# Patient Record
Sex: FEMALE | Race: WHITE | NOT HISPANIC OR LATINO | Employment: PART TIME | URBAN - METROPOLITAN AREA
[De-identification: names, ages, dates, MRNs, and addresses within clinical notes are randomized per-mention and may not be internally consistent; named-entity substitution may affect disease eponyms.]

---

## 2017-11-27 ENCOUNTER — HOSPITAL ENCOUNTER (EMERGENCY)
Facility: HOSPITAL | Age: 37
Discharge: HOME | End: 2017-11-27
Attending: EMERGENCY MEDICINE | Admitting: EMERGENCY MEDICINE
Payer: COMMERCIAL

## 2017-11-27 ENCOUNTER — APPOINTMENT (EMERGENCY)
Dept: RADIOLOGY | Facility: HOSPITAL | Age: 37
End: 2017-11-27
Payer: COMMERCIAL

## 2017-11-27 VITALS
TEMPERATURE: 98.1 F | WEIGHT: 155 LBS | RESPIRATION RATE: 18 BRPM | DIASTOLIC BLOOD PRESSURE: 99 MMHG | SYSTOLIC BLOOD PRESSURE: 182 MMHG | HEART RATE: 89 BPM | BODY MASS INDEX: 23.49 KG/M2 | HEIGHT: 68 IN | OXYGEN SATURATION: 98 %

## 2017-11-27 DIAGNOSIS — S80.02XA CONTUSION OF LEFT KNEE, INITIAL ENCOUNTER: ICD-10-CM

## 2017-11-27 DIAGNOSIS — S20.319A CHEST ABRASION: ICD-10-CM
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
## 2017-11-27 DIAGNOSIS — S82.891A CLOSED AVULSION FRACTURE OF RIGHT ANKLE, INITIAL ENCOUNTER: Primary | ICD-10-CM

## 2017-11-27 PROCEDURE — 99284 EMERGENCY DEPT VISIT MOD MDM: CPT

## 2017-11-27 PROCEDURE — 73610 X-RAY EXAM OF ANKLE: CPT

## 2017-11-27 PROCEDURE — 73564 X-RAY EXAM KNEE 4 OR MORE: CPT

## 2017-11-27 RX ORDER — NAPROXEN 250 MG/1
500 TABLET ORAL ONCE
Status: COMPLETED | OUTPATIENT
Start: 2017-11-27 | End: 2017-11-27

## 2017-11-27 RX ORDER — NAPROXEN 500 MG/1
250 TABLET ORAL 2 TIMES DAILY WITH MEALS
Qty: 20 TABLET | Refills: 0 | Status: SHIPPED | OUTPATIENT
Start: 2017-11-27 | End: 2018-01-30

## 2017-11-27 RX ADMIN — NAPROXEN 500 MG: 250 TABLET ORAL at 13:11

## 2017-11-27 NOTE — DISCHARGE INSTRUCTIONS
Please remain off of the leg, use the crutches and splint until you are seen and re-evaluated by orthopedic doctor within a week return if you worsening or other up other concerning symptoms otherwise follow up as instructed       Ankle Fracture   WHAT YOU NEED TO KNOW:   An ankle fracture is a break in 1 or more of the bones in your ankle  DISCHARGE INSTRUCTIONS:   Call 911 for any of the following:   · You feel lightheaded, short of breath, and have chest pain  · You cough up blood  Return to the emergency department if:   · Your leg feels warm, tender, and painful  It may look swollen and red  · Blood soaks through your bandage  · You have severe pain in your ankle  · Your cast feels too tight  · Your foot or toes are cold or numb  · Your foot or toenails turn blue or gray  Contact your healthcare provider if:   · Your splint feels too tight  · Your swelling has increased or returned  · You have a fever  · Your pain does not go away, even after treatment  · You have questions or concerns about your condition or care  Medicines: You may need any of the following:  · Acetaminophen  decreases pain and fever  It is available without a doctor's order  Ask how much to take and how often to take it  Follow directions  Acetaminophen can cause liver damage if not taken correctly  · NSAIDs , such as ibuprofen, help decrease swelling, pain, and fever  This medicine is available with or without a doctor's order  NSAIDs can cause stomach bleeding or kidney problems in certain people  If you take blood thinner medicine, always ask your healthcare provider if NSAIDs are safe for you  Always read the medicine label and follow directions  · Prescription pain medicine  may be given  Ask your healthcare provider how to take this medicine safely  · Take your medicine as directed  Contact your healthcare provider if you think your medicine is not helping or if you have side effects   Tell him or her if you are allergic to any medicine  Keep a list of the medicines, vitamins, and herbs you take  Include the amounts, and when and why you take them  Bring the list or the pill bottles to follow-up visits  Carry your medicine list with you in case of an emergency  Follow up with your healthcare provider in 1 to 2 days: Your fracture may need to be reduced (bones pushed back into place) or you may need surgery  Write down your questions so you remember to ask them during your visits  Support devices: You will be given a brace, cast, or splint to limit your movement and protect your ankle  You may need to use crutches to protect your ankle and decrease your pain as you move around  Do not remove your device and do not put weight on your injured ankle  Splint and cast care:  Cover the splint or cast before you bathe so it does not get wet  Tape 2 plastic trash bags to your skin above the cast  Try to keep your ankle out of the water as much as possible  Rest:  Rest your ankle so that it can heal  Return to normal activities as directed  Ice:  Apply ice on your ankle for 15 to 20 minutes every hour or as directed  Use an ice pack, or put crushed ice in a plastic bag  Cover it with a towel  Ice helps prevent tissue damage and decreases swelling and pain  Elevate:  Elevate your ankle above the level of your heart as often as you can  This will help decrease swelling and pain  Prop your ankle on pillows or blankets to keep it elevated comfortably  © 2017 [address] [employer] Information is for End User's use only and may not be sold, redistributed or otherwise used for commercial purposes  All illustrations and images included in CareNotes® are the copyrighted property of [employer]  or Yaniv Shirley  The above information is an  only  It is not intended as medical advice for individual conditions or treatments   Talk to your doctor, nurse or pharmacist before following any medical regimen to see if it is safe and effective for you  Avulsion Fracture   WHAT YOU NEED TO KNOW:   An avulsion fracture is when a small piece of bone breaks and pulls away from a larger bone  Part or all of the piece may break away  An avulsion fracture may happen when a muscle, tendon, or ligament is pulled very hard  This can occur during a fall or while playing sports  DISCHARGE INSTRUCTIONS:   Call 911 for any of the following:   · You feel lightheaded, short of breath, and have chest pain  · You cough up blood  · Your leg feels warm, tender, and painful  It may look swollen and red  Return to the emergency department if:   · Your cast cracks or is damaged  · The pain in your injured limb gets worse even after you rest and take medicine  · The skin, toes, or fingers of your injured limb become swollen, cold, or blue  Contact your healthcare provider if:   · You have numbness or tingling in your hand or foot below your cast     · You cannot move your fingers or toes below the cast      · You have new sores or redness around your cast or splint  · You have new or worsening trouble moving your injured limb  · You have questions or concerns about your condition or care  Medicines:   · Pain medicine  may be given  Ask your healthcare provider how to take this medicine safely  · Take your medicine as directed  Contact your healthcare provider if you think your medicine is not helping or if you have side effects  Tell him or her if you are allergic to any medicine  Keep a list of the medicines, vitamins, and herbs you take  Include the amounts, and when and why you take them  Bring the list or the pill bottles to follow-up visits  Carry your medicine list with you in case of an emergency  Follow up with your healthcare provider as directed: You will need to return for more tests to see how your fracture is healing   Write down your questions so you remember to ask them during your visits  Limit activity as directed:  Get plenty of rest while your fracture heals  When the pain decreases, begin normal, slow movements  Slowly start to do more each day  Rest when you feel it is needed  Ice:  Apply ice on your injury for 15 to 20 minutes every hour or as directed  Use an ice pack, or put crushed ice in a plastic bag  Cover it with a towel  Ice helps prevent tissue damage and decreases swelling and pain  Elevation:  Elevate your injured limb above the level of your heart as often as you can  This will help decrease swelling and pain  Prop your injured limb on pillows or blankets to keep it elevated comfortably  Bathing with a cast or splint: If you have a cast or splint, it is important not to get it wet  Before bathing, cover the cast or splint with a plastic bag  Tape the bag to your skin above the cast or splint to seal out the water  Hold your arm or leg away from the water in case the bag leaks  Ask when it is okay to take a bath or shower  Cast or splint care:   · Check the skin around the cast or splint every day  · Do not push down or lean on any part of the cast or splint because it may break  · Do not use a sharp or pointed object to scratch your skin under the cast or splint  Walking devices: You may need to use crutches or a walker until your fracture heals  Ask for more information about how to use these walking devices if needed  Physical therapy:  A physical therapist may teach you exercises to strengthen your injured limb once the pain is gone  © 2017 [employer] Information is for End User's use only and may not be sold, redistributed or otherwise used for commercial purposes  All illustrations and images included in CareNotes® are the copyrighted property of [employer]  or Yaniv Shirley  The above information is an  only  It is not intended as medical advice for individual conditions or treatments   Talk to your doctor, nurse or pharmacist before following any medical regimen to see if it is safe and effective for you
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
## 2017-12-05 ENCOUNTER — GENERIC CONVERSION - ENCOUNTER (OUTPATIENT)
Dept: OTHER | Facility: OTHER | Age: 37
End: 2017-12-05

## 2017-12-05 ENCOUNTER — APPOINTMENT (OUTPATIENT)
Dept: RADIOLOGY | Facility: MEDICAL CENTER | Age: 37
End: 2017-12-05
Payer: COMMERCIAL

## 2017-12-05 DIAGNOSIS — M25.579 PAIN IN ANKLE: ICD-10-CM

## 2017-12-05 DIAGNOSIS — S82.832A OTHER FRACTURE OF UPPER AND LOWER END OF LEFT FIBULA, INITIAL ENCOUNTER FOR CLOSED FRACTURE: ICD-10-CM

## 2017-12-05 DIAGNOSIS — S93.409A SPRAIN OF LIGAMENT OF ANKLE: ICD-10-CM

## 2017-12-05 PROCEDURE — 73610 X-RAY EXAM OF ANKLE: CPT

## 2017-12-13 ENCOUNTER — APPOINTMENT (OUTPATIENT)
Dept: PHYSICAL THERAPY | Facility: CLINIC | Age: 37
End: 2017-12-13
Payer: COMMERCIAL

## 2017-12-13 ENCOUNTER — GENERIC CONVERSION - ENCOUNTER (OUTPATIENT)
Dept: OBGYN CLINIC | Facility: MEDICAL CENTER | Age: 37
End: 2017-12-13

## 2017-12-13 PROCEDURE — G8990 OTHER PT/OT CURRENT STATUS: HCPCS

## 2017-12-13 PROCEDURE — 97161 PT EVAL LOW COMPLEX 20 MIN: CPT

## 2017-12-13 PROCEDURE — 97535 SELF CARE MNGMENT TRAINING: CPT

## 2017-12-13 PROCEDURE — G8991 OTHER PT/OT GOAL STATUS: HCPCS

## 2017-12-15 ENCOUNTER — APPOINTMENT (OUTPATIENT)
Dept: PHYSICAL THERAPY | Facility: CLINIC | Age: 37
End: 2017-12-15
Payer: COMMERCIAL

## 2017-12-15 PROCEDURE — 97140 MANUAL THERAPY 1/> REGIONS: CPT

## 2017-12-15 PROCEDURE — 97110 THERAPEUTIC EXERCISES: CPT

## 2017-12-19 ENCOUNTER — GENERIC CONVERSION - ENCOUNTER (OUTPATIENT)
Dept: OBGYN CLINIC | Facility: MEDICAL CENTER | Age: 37
End: 2017-12-19

## 2017-12-19 ENCOUNTER — APPOINTMENT (OUTPATIENT)
Dept: PHYSICAL THERAPY | Facility: CLINIC | Age: 37
End: 2017-12-19
Payer: COMMERCIAL

## 2017-12-19 PROCEDURE — 97140 MANUAL THERAPY 1/> REGIONS: CPT

## 2017-12-19 PROCEDURE — 97110 THERAPEUTIC EXERCISES: CPT

## 2017-12-21 ENCOUNTER — APPOINTMENT (OUTPATIENT)
Dept: PHYSICAL THERAPY | Facility: CLINIC | Age: 37
End: 2017-12-21
Payer: COMMERCIAL

## 2017-12-21 PROCEDURE — 97110 THERAPEUTIC EXERCISES: CPT

## 2017-12-21 PROCEDURE — 97140 MANUAL THERAPY 1/> REGIONS: CPT

## 2017-12-25 ENCOUNTER — HOSPITAL ENCOUNTER (EMERGENCY)
Facility: HOSPITAL | Age: 37
Discharge: HOME | End: 2017-12-25
Attending: EMERGENCY MEDICINE
Payer: COMMERCIAL

## 2017-12-25 VITALS
DIASTOLIC BLOOD PRESSURE: 111 MMHG | HEART RATE: 90 BPM | WEIGHT: 160 LBS | TEMPERATURE: 98.6 F | HEIGHT: 68 IN | RESPIRATION RATE: 20 BRPM | BODY MASS INDEX: 24.25 KG/M2 | SYSTOLIC BLOOD PRESSURE: 171 MMHG | OXYGEN SATURATION: 97 %

## 2017-12-25 DIAGNOSIS — S61.211A LACERATION OF LEFT INDEX FINGER WITHOUT FOREIGN BODY WITHOUT DAMAGE TO NAIL, INITIAL ENCOUNTER: Primary | ICD-10-CM

## 2017-12-25 PROCEDURE — 99282 EMERGENCY DEPT VISIT SF MDM: CPT

## 2017-12-25 PROCEDURE — 90715 TDAP VACCINE 7 YRS/> IM: CPT | Performed by: EMERGENCY MEDICINE

## 2017-12-25 PROCEDURE — 90471 IMMUNIZATION ADMIN: CPT

## 2017-12-25 RX ORDER — LIDOCAINE HYDROCHLORIDE AND EPINEPHRINE 10; 10 MG/ML; UG/ML
5 INJECTION, SOLUTION INFILTRATION; PERINEURAL ONCE
Status: COMPLETED | OUTPATIENT
Start: 2017-12-25 | End: 2017-12-25

## 2017-12-25 RX ORDER — BACITRACIN, NEOMYCIN, POLYMYXIN B 400; 3.5; 5 [USP'U]/G; MG/G; [USP'U]/G
1 OINTMENT TOPICAL ONCE
Status: COMPLETED | OUTPATIENT
Start: 2017-12-25 | End: 2017-12-25

## 2017-12-25 RX ADMIN — LIDOCAINE HYDROCHLORIDE,EPINEPHRINE BITARTRATE 5 ML: 10; .01 INJECTION, SOLUTION INFILTRATION; PERINEURAL at 11:05

## 2017-12-25 RX ADMIN — TETANUS TOXOID, REDUCED DIPHTHERIA TOXOID AND ACELLULAR PERTUSSIS VACCINE, ADSORBED 0.5 ML: 5; 2.5; 8; 8; 2.5 SUSPENSION INTRAMUSCULAR at 11:03

## 2017-12-25 RX ADMIN — BACITRACIN, NEOMYCIN, POLYMYXIN B 1 SMALL APPLICATION: 400; 3.5; 5 OINTMENT TOPICAL at 11:40

## 2017-12-25 NOTE — DISCHARGE INSTRUCTIONS
Finger Laceration   WHAT YOU NEED TO KNOW:   A finger laceration is a deep cut in your skin  It is often caused by a sharp object, such as a knife, or blunt force to your finger  Your blood vessels, bones, joints, tendons, or nerves may also be injured  DISCHARGE INSTRUCTIONS:   Return to the emergency department if:   · Your wound comes apart  · Blood soaks through your bandage  · You have severe pain in your finger or hand  · Your finger is pale and cold  · You have sudden trouble moving your finger  · Your swelling suddenly gets worse  · You have red streaks on your skin coming from your wound  Contact your healthcare provider or hand specialist if:   · You have new numbness or tingling  · Your finger feels warm, looks swollen or red, and is draining pus  · You have a fever  · You have questions or concerns about your condition or care  Medicines: You may  need any of the following:  · Antibiotics  help prevent a bacterial infection  · Acetaminophen  decreases pain and fever  It is available without a doctor's order  Ask how much to take and how often to take it  Follow directions  Read the labels of all other medicines you are using to see if they also contain acetaminophen, or ask your doctor or pharmacist  Acetaminophen can cause liver damage if not taken correctly  Do not use more than 4 grams (4,000 milligrams) total of acetaminophen in one day  · Prescription pain medicine  may be given  Ask your healthcare provider how to take this medicine safely  Some prescription pain medicines contain acetaminophen  Do not take other medicines that contain acetaminophen without talking to your healthcare provider  Too much acetaminophen may cause liver damage  Prescription pain medicine may cause constipation  Ask your healthcare provider how to prevent or treat constipation  · Take your medicine as directed    Contact your healthcare provider if you think your medicine is not helping or if you have side effects  Tell him or her if you are allergic to any medicine  Keep a list of the medicines, vitamins, and herbs you take  Include the amounts, and when and why you take them  Bring the list or the pill bottles to follow-up visits  Carry your medicine list with you in case of an emergency  Self-care:   · Apply ice  on your finger for 15 to 20 minutes every hour or as directed  Use an ice pack, or put crushed ice in a plastic bag  Cover it with a towel before you apply it to your skin  Ice helps prevent tissue damage and decreases swelling and pain  · Elevate  your hand above the level of your heart as often as you can  This will help decrease swelling and pain  Prop your hand on pillows or blankets to keep it elevated comfortably  · Wear your splint as directed  A splint will decrease movement and stress on your wound  The splint may help your wound heal faster  Ask your healthcare provider how to apply and remove a splint  · Apply ointments to decrease scarring  Do not apply ointments until your healthcare provider says it is okay  You may need to wait until your wound is healed  Ask which ointment to buy and how often to use it  Wound care:   · Do not get your wound wet until your healthcare provider says it is okay  Do not soak your hand in water  Do not go swimming until your healthcare provider says it is okay  When your healthcare provider says it is okay, carefully wash around the wound with soap and water  Let soap and water run over your wound  Gently pat the area dry or allow it to air dry  · Change your bandages when they get wet, dirty, or after washing  Apply new, clean bandages as directed  Do not apply elastic bandages or tape too tightly  Do not put powders or lotions on your wound  · Apply antibiotic ointment as directed  Your healthcare provider may give you antibiotic ointment to put over your wound if you have stitches   If you have Strips-Strips over your wound, let them dry up and fall off on their own  If they do not fall off within 14 days, gently remove them  If you have glue over your wound, do not remove or pick at it  If your glue comes off, do not replace it with glue that you have at home  · Check your wound every day for signs of infection  Signs of infection include swelling, redness, or pus  Follow up with your healthcare provider or hand specialist in 2 days:  Write down your questions so you remember to ask them during your visits  © 2017 [address] [employer] Information is for End User's use only and may not be sold, redistributed or otherwise used for commercial purposes  All illustrations and images included in CareNotes® are the copyrighted property of [employer]  or [employer]  The above information is an  only  It is not intended as medical advice for individual conditions or treatments  Talk to your doctor, nurse or pharmacist before following any medical regimen to see if it is safe and effective for you  Care For Your Stitches   WHAT YOU NEED TO KNOW:   Stitches, or sutures, are used to close cuts and wounds on the skin  Stitches need to be removed after your wound has healed  DISCHARGE INSTRUCTIONS:   Seek care immediately if:   · Your stitches come apart  · Blood soaks through your bandages  · You suddenly cannot move your injured joint  · You have sudden numbness around your wound  · You see red streaks coming from your wound  Contact your healthcare provider if:   · You have a fever and chills  · Your wound is red, warm, swollen, or leaking pus  · There is a bad smell coming from your wound  · You have increased pain in the wound area  · You have questions or concerns about your condition or care  Care for your stitches:  Keep your stitches clean and dry   You may need to cover your stitches with a bandage for 24 to 48 hours, or as directed  Do not bump or hit the suture area, as this could open the wound  Do not trim or shorten the ends of your stitches  If they rub on your clothing, put a gauze bandage between the stitches and your clothes  Clean your wound:  Carefully wash your wound with soap and water  For mouth and lip wounds, rinse your mouth after meals and at bedtime  Ask your healthcare provider what to use to rinse your mouth  If you have a scalp wound, you may gently wash your hair every 2 days with mild shampoo  Do not use hair products, such as hair spray  Help your wound heal:   · Elevate  your wound above the level of your heart as often as you can  This will help decrease swelling and pain  Prop your wound on pillows or blankets to keep it elevated comfortably  · Limit activity  Do not stretch the skin around your wound  This will help prevent bleeding and swelling of the wound area  Follow up with your healthcare provider as directed: You may need to return to have your stitches removed  Write down your questions so you remember to ask them during your visits  © 2017 [address] [facility] Information is for End User's use only and may not be sold, redistributed or otherwise used for commercial purposes  All illustrations and images included in CareNotes® are the copyrighted property of [employer]  or [employer]  The above information is an  only  It is not intended as medical advice for individual conditions or treatments  Talk to your doctor, nurse or pharmacist before following any medical regimen to see if it is safe and effective for you

## 2017-12-25 NOTE — ED PROVIDER NOTES
History  Chief Complaint   Patient presents with    Finger Laceration     Pt stated that she cut her left pointer finger last night while cooking  Patient is a 59-year-old female with no significant past medical history, presenting to the emergency department with a left 2nd digit laceration that she sustained last night while preparing food  Patient states the injury happened with a kitchen knife and approximately 9:00 p m  last night  She washed out the wound and did not think it needed stitches however when she awoke today, it continued to bleed so she thought she would come to the ED for evaluation  She states she wrapped it very tightly last night and had some tingling sensation in the tip of her finger but ever since she removed the wound dressing, the tingling sensation has improved  She denies any significant pain other than mild burning at the wound site  Denies any weakness in the hand or discoloration of the digit  She denies being on any blood thinners  Denies any history of immunocompromising conditions or poor wound healing in the past   She is unsure when her last tetanus shot was but does not think she is up-to-date  She has no other symptoms or complaints at this time and review of systems otherwise negative  History provided by:  Patient   used: No    Finger Laceration   Associated symptoms: no fever and no rash        Prior to Admission Medications   Prescriptions Last Dose Informant Patient Reported? Taking?   naproxen (NAPROSYN) 500 mg tablet   No No   Sig: Take 0 5 tablets by mouth 2 (two) times a day with meals for 10 days      Facility-Administered Medications: None       History reviewed  No pertinent past medical history  Past Surgical History:   Procedure Laterality Date    TUBAL LIGATION         History reviewed  No pertinent family history  I have reviewed and agree with the history as documented      Social History   Substance Use Topics  Smoking status: Current Every Day Smoker     Packs/day: 0 50    Smokeless tobacco: Never Used    Alcohol use No        Review of Systems   Constitutional: Negative for chills and fever  HENT: Negative for congestion, ear pain, rhinorrhea and sore throat  Eyes: Negative for pain and visual disturbance  Respiratory: Negative for cough, shortness of breath and wheezing  Cardiovascular: Negative for chest pain and palpitations  Gastrointestinal: Negative for abdominal pain, constipation, diarrhea, nausea and vomiting  Genitourinary: Negative for dysuria, flank pain, frequency and hematuria  Musculoskeletal: Negative for back pain and neck pain  Skin: Positive for wound  Negative for color change, pallor and rash  Allergic/Immunologic: Negative for immunocompromised state  Neurological: Positive for numbness  Negative for dizziness, syncope, weakness, light-headedness and headaches  Hematological: Does not bruise/bleed easily  Psychiatric/Behavioral: Negative for confusion and decreased concentration  All other systems reviewed and are negative  Physical Exam  ED Triage Vitals [12/25/17 1021]   Temperature Pulse Respirations Blood Pressure SpO2   98 6 °F (37 °C) 90 20 (!) 171/111 97 %      Temp Source Heart Rate Source Patient Position - Orthostatic VS BP Location FiO2 (%)   Oral Monitor Sitting Right arm --      Pain Score       8           Orthostatic Vital Signs  Vitals:    12/25/17 1021   BP: (!) 171/111   Pulse: 90   Patient Position - Orthostatic VS: Sitting       Physical Exam   Constitutional: She is oriented to person, place, and time  She appears well-developed and well-nourished  No distress  HENT:   Head: Normocephalic and atraumatic  Mucous membranes moist    Eyes: Conjunctivae and EOM are normal  Pupils are equal, round, and reactive to light  Neck: Normal range of motion  Neck supple     Cardiovascular: Normal rate, regular rhythm, normal heart sounds and intact distal pulses  Exam reveals no gallop and no friction rub  No murmur heard  Pulmonary/Chest: Effort normal and breath sounds normal  No respiratory distress  She has no wheezes  She has no rales  Abdominal: Soft  She exhibits no distension  There is no tenderness  There is no rebound and no guarding  Musculoskeletal: Normal range of motion  She exhibits no edema or tenderness  Left upper extremity neurovascularly intact  No gross motor or sensory deficits in the ulnar, radial or median nerve distribution  Full range of motion of left hand and left 2nd digit at all joints  No significant bony tenderness of the 2nd digit of the left hand  Neurological: She is alert and oriented to person, place, and time  No gross motor or sensory deficits  Skin: Skin is warm and dry  No rash noted  She is not diaphoretic  No pallor  There is a 1 5-2 cm V-shaped laceration of the left 2nd digit, palmar/medial aspect, just proximal to the DIP joint  Wound does not cross joint line  Minimal venous oozing but no arterial bleeding  No tendon exposure  No surrounding erythema or warmth  No significant swelling of the digit  Psychiatric: She has a normal mood and affect  Her behavior is normal    Nursing note and vitals reviewed        ED Medications  Medications   neomycin-bacitracin-polymyxin b (NEOSPORIN) ointment 1 small application (not administered)   tetanus-diphtheria-acellular pertussis (BOOSTRIX) IM injection 0 5 mL (0 5 mL Intramuscular Given 12/25/17 1103)   lidocaine-epinephrine (XYLOCAINE/EPINEPHRINE) 1 %-1:100,000 injection 5 mL (5 mL Infiltration Given 12/25/17 1105)       Diagnostic Studies  Results Reviewed     None                 No orders to display              Procedures  Lac Repair  Date/Time: 12/25/2017 11:32 AM  Performed by: Chase Silva by: Robert Milian     Patient location:  ED and bedside  Other Assisting Provider: No    Consent:     Consent obtained:  Verbal Consent given by:  Patient    Risks discussed:  Infection, need for additional repair, nerve damage, poor wound healing, poor cosmetic result, pain, retained foreign body, tendon damage and vascular damage    Alternatives discussed:  No treatment and referral  Universal protocol:     Procedure explained and questions answered to patient or proxy's satisfaction: yes      Imaging studies available: no      Patient identity confirmed:  Verbally with patient  Anesthesia (see MAR for exact dosages): Anesthesia method:  Nerve block    Block needle gauge:  27 G    Block anesthetic:  Lidocaine 1% WITH epi    Block technique:  Digital block - medial and lateral side of base of left 2nd digit    Block injection procedure:  Anatomic landmarks identified, anatomic landmarks palpated and negative aspiration for blood    Block outcome:  Anesthesia achieved  Laceration details:     Location:  Finger    Finger location:  L index finger    Length (cm) of Repair:  1 5  Repair type:     Repair type:  Simple  Exploration:     Hemostasis achieved with:  Direct pressure    Wound exploration: wound explored through full range of motion and entire depth of wound probed and visualized      Wound extent: no foreign bodies/material noted, no muscle damage noted, no tendon damage noted and no vascular damage noted      Contaminated: no    Treatment:     Area cleansed with:  Water    Amount of cleaning:  Standard    Irrigation solution:  Sterile water    Irrigation method:  Pressure wash    Visualized foreign bodies/material removed: yes    Skin repair:     Repair method:  Sutures    Suture size:  5-0    Suture material:  Nylon    Suture technique:  Simple interrupted    Number of sutures:  3  Approximation:     Approximation:  Loose    Approximation difficulty:  Simple  Post-procedure details:     Dressing:  Antibiotic ointment and sterile dressing    Patient tolerance of procedure:   Tolerated well, no immediate complications Phone Contacts  ED Phone Contact    ED Course  ED Course                                MDM  Number of Diagnoses or Management Options  Diagnosis management comments: Left 2nd digit laceration, superficial but continuously losing blood  Despite patient being just out of the 12 hour window for surgical repair, I will tack down wound with 2-3 stitches as it will likely continue to reopen if not tacked down  Advised patient to be on the lookout for any signs of wound infection such as erythema, warmth, proximal red streaking, finger swelling or increased pain or fever or chills  Will update tetanus  Also advised patient to follow up with a family doctor to get her blood pressure rechecked as it is significantly elevated today  She does states she always has high blood pressure but has never been evaluated by a primary care doctor for this  CritCare Time    Disposition  Final diagnoses:   Laceration of left index finger without foreign body without damage to nail, initial encounter     Time reflects when diagnosis was documented in both MDM as applicable and the Disposition within this note     Time User Action Codes Description Comment    12/25/2017 11:34 AM Ana Akins [[id number]] Laceration of left index finger without foreign body without damage to nail, initial encounter       ED Disposition     ED Disposition Condition Comment    Discharge  [facility] discharge to home/self care      Condition at discharge: Stable        Follow-up Information     Follow up With Specialties Details Why Contact Info Additional Information    [employer]  Call to get referral to a primary care doctor [address] [place] [address] Emergency Department Emergency Medicine Go to For suture removal in 10-14 days or immediately if signs of wound infection develop [address] [facility] [address]  [phone] [place] ED, [address] [facility], [address] [place] [address], [phone]        Patient's Medications   Discharge Prescriptions    No medications on file     No discharge procedures on file      ED Provider  Electronically Signed by           Yolis Fish DO  12/25/17 1131

## 2017-12-28 ENCOUNTER — APPOINTMENT (OUTPATIENT)
Dept: PHYSICAL THERAPY | Facility: CLINIC | Age: 37
End: 2017-12-28
Payer: COMMERCIAL

## 2018-01-02 ENCOUNTER — APPOINTMENT (OUTPATIENT)
Dept: RADIOLOGY | Facility: MEDICAL CENTER | Age: 38
End: 2018-01-02
Payer: COMMERCIAL

## 2018-01-02 ENCOUNTER — ALLSCRIPTS OFFICE VISIT (OUTPATIENT)
Dept: OTHER | Facility: OTHER | Age: 38
End: 2018-01-02

## 2018-01-02 ENCOUNTER — APPOINTMENT (OUTPATIENT)
Dept: PHYSICAL THERAPY | Facility: CLINIC | Age: 38
End: 2018-01-02
Payer: COMMERCIAL

## 2018-01-02 DIAGNOSIS — M25.579 PAIN IN ANKLE: ICD-10-CM

## 2018-01-02 PROCEDURE — 97140 MANUAL THERAPY 1/> REGIONS: CPT

## 2018-01-02 PROCEDURE — 73610 X-RAY EXAM OF ANKLE: CPT

## 2018-01-02 PROCEDURE — 97110 THERAPEUTIC EXERCISES: CPT

## 2018-01-05 ENCOUNTER — GENERIC CONVERSION - ENCOUNTER (OUTPATIENT)
Dept: OBGYN CLINIC | Facility: MEDICAL CENTER | Age: 38
End: 2018-01-05

## 2018-01-09 ENCOUNTER — HOSPITAL ENCOUNTER (EMERGENCY)
Facility: HOSPITAL | Age: 38
Discharge: HOME | End: 2018-01-09
Attending: EMERGENCY MEDICINE | Admitting: EMERGENCY MEDICINE
Payer: COMMERCIAL

## 2018-01-09 VITALS
DIASTOLIC BLOOD PRESSURE: 119 MMHG | RESPIRATION RATE: 16 BRPM | SYSTOLIC BLOOD PRESSURE: 170 MMHG | TEMPERATURE: 97.9 F | HEART RATE: 92 BPM | OXYGEN SATURATION: 99 %

## 2018-01-09 DIAGNOSIS — Z48.02 VISIT FOR SUTURE REMOVAL: Primary | ICD-10-CM

## 2018-01-09 PROCEDURE — 99281 EMR DPT VST MAYX REQ PHY/QHP: CPT

## 2018-01-09 NOTE — ED PROVIDER NOTES
History  Chief Complaint   Patient presents with    Suture / Staple Removal     40year old female presents to ED for suture removal   Patient reports she sliced her left index finger with knife 2 weeks ago  Was seen in ED for suture repair  Reports good wound healing  She reports mild numbness around laceration  Denies any other deficits  Location left ring finger  Quality is sutured wound  Severity is reported as mild  Modifiers: no known aggravating or alleviating factors  History provided by:  Patient   used: No        Prior to Admission Medications   Prescriptions Last Dose Informant Patient Reported? Taking?   naproxen (NAPROSYN) 500 mg tablet   No No   Sig: Take 0 5 tablets by mouth 2 (two) times a day with meals for 10 days      Facility-Administered Medications: None       History reviewed  No pertinent past medical history  Past Surgical History:   Procedure Laterality Date    TUBAL LIGATION         History reviewed  No pertinent family history  I have reviewed and agree with the history as documented  Social History   Substance Use Topics    Smoking status: Current Every Day Smoker     Packs/day: 0 50    Smokeless tobacco: Never Used    Alcohol use No        Review of Systems   Constitutional: Negative for activity change, diaphoresis, fatigue and fever  HENT: Negative for ear pain, nosebleeds, rhinorrhea and sore throat  Eyes: Negative for pain, discharge, redness and itching  Respiratory: Negative for cough, shortness of breath, wheezing and stridor  Cardiovascular: Negative for chest pain, palpitations and leg swelling  Gastrointestinal: Negative for abdominal pain, constipation, diarrhea, nausea and vomiting  Endocrine: Negative  Genitourinary: Negative for dysuria, frequency, hematuria and urgency  Musculoskeletal: Negative for arthralgias, back pain, gait problem, joint swelling, myalgias, neck pain and neck stiffness     Skin: Positive for wound  Negative for color change, pallor and rash  Allergic/Immunologic: Negative  Neurological: Negative for dizziness, tremors, seizures, syncope, facial asymmetry, speech difficulty, weakness, light-headedness and headaches  Hematological: Negative  Psychiatric/Behavioral: Negative for behavioral problems, decreased concentration and hallucinations  The patient is not nervous/anxious  All other systems reviewed and are negative  Physical Exam  ED Triage Vitals [01/09/18 1541]   Temperature Pulse Respirations Blood Pressure SpO2   97 9 °F (36 6 °C) 92 16 (!) 170/119 99 %      Temp Source Heart Rate Source Patient Position - Orthostatic VS BP Location FiO2 (%)   Tympanic -- -- Right arm --      Pain Score       --           Orthostatic Vital Signs  Vitals:    01/09/18 1541   BP: (!) 170/119   Pulse: 92       Physical Exam   Constitutional: She is oriented to person, place, and time  She appears well-developed and well-nourished  No distress  BP (!) 170/119   Pulse 92   Temp 97 9 °F (36 6 °C) (Tympanic)   Resp 16   SpO2 99%   interp blood pressure hypertensive  Pulse normal     HENT:   Head: Normocephalic and atraumatic  Right Ear: External ear normal    Left Ear: External ear normal    Nose: Nose normal    Eyes: Conjunctivae and EOM are normal  Right eye exhibits no discharge  Left eye exhibits no discharge  No scleral icterus  Neck: Normal range of motion  Neck supple  No tracheal deviation present  Cardiovascular: Normal rate, regular rhythm and intact distal pulses  Pulmonary/Chest: Effort normal and breath sounds normal  No stridor  No respiratory distress  She has no wheezes  Abdominal: Soft  Bowel sounds are normal  She exhibits no distension  There is no tenderness  Musculoskeletal: Normal range of motion  She exhibits no edema, tenderness or deformity  Left index finger - normal inspection, no gross deformity, cap refill less than 3 seconds    Strength 5/5 normal   FROM at left DIPj, PIPj, and MCPj  Flex/ext tendon function fully intact  Lymphadenopathy:     She has no cervical adenopathy  Neurological: She is alert and oriented to person, place, and time  She displays normal reflexes  No cranial nerve deficit or sensory deficit  She exhibits normal muscle tone  Coordination normal    Skin: Skin is warm and dry  Capillary refill takes less than 2 seconds  No rash noted  She is not diaphoretic  No erythema  No pallor  Well healed laceration to left index finger  All sutures intact  No surrounding erythema  No dehiscence  Psychiatric: She has a normal mood and affect  Her behavior is normal  Judgment and thought content normal    Nursing note and vitals reviewed  ED Medications  Medications - No data to display    Diagnostic Studies  Results Reviewed     None                 No orders to display              Procedures  Suture Removal  Date/Time: 1/9/2018 3:40 PM  Performed by: Ramone Dick  Authorized by: Misti Lloyd     Patient location:  ED  Other Assisting Provider: No    Consent:     Consent obtained:  Verbal    Consent given by:  Patient    Risks discussed:  Bleeding, pain and wound separation    Alternatives discussed:  No treatment  Universal protocol:     Patient identity confirmed:  Verbally with patient  Location:     Laterality:  Left    Location:  Upper extremity    Upper extremity location:  Hand    Hand location:  L index finger  Procedure details: Tools used:  Suture removal kit    Wound appearance:  No sign(s) of infection    Number of sutures removed:  3  Post-procedure details:     Post-removal:  No dressing applied    Patient tolerance of procedure:   Tolerated well, no immediate complications    Complication (if applicable):  None           Phone Contacts  ED Phone Contact    ED Course  ED Course                                MDM  Number of Diagnoses or Management Options  Visit for suture removal: minor  Diagnosis management comments: Sutures removed  Patient blood pressure noted to be elevated, instructed to follow up with pcp for recheck and further evaluation of elevated blood pressure  Patient Progress  Patient progress: stable    CritCare Time    Disposition  Final diagnoses:   Visit for suture removal     Time reflects when diagnosis was documented in both MDM as applicable and the Disposition within this note     Time User Action Codes Description Comment    1/9/2018  3:39 PM Anel Akins [Z48 02] Visit for suture removal       ED Disposition     ED Disposition Condition Comment    Discharge  [address] [place] [address] discharge to home/self care  Condition at discharge: Stable        Follow-up Information     Follow up With Specialties Details Why Contact Info Additional Information    Joseph Laurent MD Family Medicine Call in 1 week For wound re-check [address] Emergency Department Emergency Medicine Go to If symptoms worsen [address] [facility] [address]  [phone] [place] ED, [address] [facility], [address]        Discharge Medication List as of 1/9/2018  3:40 PM      CONTINUE these medications which have NOT CHANGED    Details   naproxen (NAPROSYN) 500 mg tablet Take 0 5 tablets by mouth 2 (two) times a day with meals for 10 days, Starting Mon 11/27/2017, Until Thu 12/7/2017, Print           No discharge procedures on file      ED Provider  Electronically Signed by           Candace Baez PA-C  01/09/18 6387

## 2018-01-09 NOTE — DISCHARGE INSTRUCTIONS
Stitches Removal   AMBULATORY CARE:   Stitches  may need to be removed in 3 to 14 days depending on the location of your wound  Your healthcare provider will use sterile forceps or tweezers to  the knot of each stitch  He will cut the stitch with scissors and pull the stitch out  You may feel a slight tug as the stitch comes out  He may place small steristrips across your wound after the stitches have been removed  These pieces of tape will peel and fall of on their own  Do not pull them off  Seek care immediately if:   · Your wound splits open  · You suddenly cannot move your injured joint  · You have sudden numbness around your wound  · You see red streaks coming from your wound  Contact your healthcare provider if:   · You have a fever and chills  · Your wound is red, warm, swollen, or leaking pus  · There is a bad smell coming from your wound  · You have increased pain in the wound area  · You have questions or concerns about your condition or care  Care for your wound:   · Clean your wound as directed  Carefully wash your wound with soap and water  Pat the area dry with a clean towel  · Protect your wound  Your wound can swell, bleed, or split open if it is stretched or bumped  You may need to wear a bandage that supports your wound until it is completely healed  · Minimize your scar  Use sunblock if your wound is exposed to the sun  Apply it every day after the stitches are removed  This will help prevent skin discoloration  Follow up with your healthcare provider as directed: You may need to return in 3 to 5 days if the stitches are on your face  Stitches on your scalp need to be removed after 7 to 14 days  Stitches over joints may remain in place up to 14 days  Write down your questions so you remember to ask them during your visits     © 2017 [address] [employer] Information is for End User's use only and may not be sold, redistributed or otherwise used for commercial purposes  All illustrations and images included in CareNotes® are the copyrighted property of [employer]  or [employer]  The above information is an  only  It is not intended as medical advice for individual conditions or treatments  Talk to your doctor, nurse or pharmacist before following any medical regimen to see if it is safe and effective for you

## 2018-01-19 ENCOUNTER — GENERIC CONVERSION - ENCOUNTER (OUTPATIENT)
Dept: OBGYN CLINIC | Facility: MEDICAL CENTER | Age: 38
End: 2018-01-19

## 2018-01-23 NOTE — MISCELLANEOUS
Message  Return to work or school:   Reynaldo Gee is under my professional care   She was seen in my office on 1/2/18             Signatures   Electronically signed by : Will Caban DO; Jan 2 2018  1:54PM EST                       (Author)

## 2018-01-24 VITALS
WEIGHT: 155 LBS | HEART RATE: 80 BPM | DIASTOLIC BLOOD PRESSURE: 104 MMHG | HEIGHT: 68 IN | BODY MASS INDEX: 23.49 KG/M2 | SYSTOLIC BLOOD PRESSURE: 153 MMHG

## 2018-01-30 ENCOUNTER — OFFICE VISIT (OUTPATIENT)
Dept: FAMILY MEDICINE CLINIC | Facility: MEDICAL CENTER | Age: 38
End: 2018-01-30
Payer: COMMERCIAL

## 2018-01-30 ENCOUNTER — APPOINTMENT (OUTPATIENT)
Dept: LAB | Facility: MEDICAL CENTER | Age: 38
End: 2018-01-30
Payer: COMMERCIAL

## 2018-01-30 ENCOUNTER — APPOINTMENT (OUTPATIENT)
Dept: RADIOLOGY | Facility: MEDICAL CENTER | Age: 38
End: 2018-01-30
Payer: COMMERCIAL

## 2018-01-30 ENCOUNTER — OFFICE VISIT (OUTPATIENT)
Dept: OBGYN CLINIC | Facility: MEDICAL CENTER | Age: 38
End: 2018-01-30
Payer: COMMERCIAL

## 2018-01-30 VITALS
HEIGHT: 68 IN | SYSTOLIC BLOOD PRESSURE: 166 MMHG | RESPIRATION RATE: 16 BRPM | DIASTOLIC BLOOD PRESSURE: 116 MMHG | BODY MASS INDEX: 27.4 KG/M2 | WEIGHT: 180.8 LBS | HEART RATE: 76 BPM

## 2018-01-30 VITALS
BODY MASS INDEX: 23.49 KG/M2 | WEIGHT: 155 LBS | HEIGHT: 68 IN | HEART RATE: 78 BPM | SYSTOLIC BLOOD PRESSURE: 170 MMHG | DIASTOLIC BLOOD PRESSURE: 117 MMHG

## 2018-01-30 DIAGNOSIS — I10 HYPERTENSION, UNSPECIFIED TYPE: Primary | ICD-10-CM

## 2018-01-30 DIAGNOSIS — R06.83 SNORING: ICD-10-CM

## 2018-01-30 DIAGNOSIS — I10 ESSENTIAL HYPERTENSION: ICD-10-CM

## 2018-01-30 DIAGNOSIS — M25.571 PAIN, JOINT, ANKLE AND FOOT, RIGHT: Primary | ICD-10-CM

## 2018-01-30 DIAGNOSIS — S82.831D CLOSED FRACTURE OF DISTAL END OF RIGHT FIBULA WITH ROUTINE HEALING, UNSPECIFIED FRACTURE MORPHOLOGY, SUBSEQUENT ENCOUNTER: ICD-10-CM

## 2018-01-30 DIAGNOSIS — I10 HYPERTENSION, UNSPECIFIED TYPE: ICD-10-CM

## 2018-01-30 DIAGNOSIS — J35.1 LARGE TONSILS: ICD-10-CM

## 2018-01-30 PROBLEM — S82.831A FRACTURE OF FIBULA, DISTAL, RIGHT, CLOSED: Status: ACTIVE | Noted: 2017-12-05

## 2018-01-30 PROBLEM — M25.579 ANKLE PAIN: Status: ACTIVE | Noted: 2017-12-05

## 2018-01-30 LAB
ALBUMIN SERPL BCP-MCNC: 4 G/DL (ref 3.5–5)
ALP SERPL-CCNC: 72 U/L (ref 46–116)
ALT SERPL W P-5'-P-CCNC: 49 U/L (ref 12–78)
ANION GAP SERPL CALCULATED.3IONS-SCNC: 7 MMOL/L (ref 4–13)
AST SERPL W P-5'-P-CCNC: 15 U/L (ref 5–45)
BILIRUB SERPL-MCNC: 0.35 MG/DL (ref 0.2–1)
BUN SERPL-MCNC: 7 MG/DL (ref 5–25)
CALCIUM SERPL-MCNC: 8.8 MG/DL (ref 8.3–10.1)
CHLORIDE SERPL-SCNC: 102 MMOL/L (ref 100–108)
CHOLEST SERPL-MCNC: 159 MG/DL (ref 50–200)
CO2 SERPL-SCNC: 26 MMOL/L (ref 21–32)
CREAT SERPL-MCNC: 0.83 MG/DL (ref 0.6–1.3)
GFR SERPL CREATININE-BSD FRML MDRD: 90 ML/MIN/1.73SQ M
GLUCOSE P FAST SERPL-MCNC: 83 MG/DL (ref 65–99)
HDLC SERPL-MCNC: 36 MG/DL (ref 40–60)
LDLC SERPL CALC-MCNC: 101 MG/DL (ref 0–100)
POTASSIUM SERPL-SCNC: 3.8 MMOL/L (ref 3.5–5.3)
PROT SERPL-MCNC: 8.2 G/DL (ref 6.4–8.2)
SODIUM SERPL-SCNC: 135 MMOL/L (ref 136–145)
TRIGL SERPL-MCNC: 110 MG/DL
TSH SERPL DL<=0.05 MIU/L-ACNC: 3.53 UIU/ML (ref 0.36–3.74)

## 2018-01-30 PROCEDURE — 99214 OFFICE O/P EST MOD 30 MIN: CPT | Performed by: FAMILY MEDICINE

## 2018-01-30 PROCEDURE — 36415 COLL VENOUS BLD VENIPUNCTURE: CPT

## 2018-01-30 PROCEDURE — 73610 X-RAY EXAM OF ANKLE: CPT

## 2018-01-30 PROCEDURE — 3008F BODY MASS INDEX DOCD: CPT | Performed by: FAMILY MEDICINE

## 2018-01-30 PROCEDURE — 80053 COMPREHEN METABOLIC PANEL: CPT

## 2018-01-30 PROCEDURE — 80061 LIPID PANEL: CPT

## 2018-01-30 PROCEDURE — 99202 OFFICE O/P NEW SF 15 MIN: CPT | Performed by: FAMILY MEDICINE

## 2018-01-30 PROCEDURE — 84443 ASSAY THYROID STIM HORMONE: CPT

## 2018-01-30 RX ORDER — PERINDOPRIL ERBUMINE 4 MG/1
4 TABLET ORAL DAILY
Qty: 90 TABLET | Refills: 0 | Status: SHIPPED | OUTPATIENT
Start: 2018-01-30 | End: 2018-01-31

## 2018-01-30 RX ORDER — OXYCODONE HYDROCHLORIDE 5 MG/1
5 TABLET ORAL
COMMUNITY
Start: 2015-10-18 | End: 2018-01-30

## 2018-01-30 RX ORDER — DOCUSATE SODIUM 100 MG/1
100 CAPSULE, LIQUID FILLED ORAL
COMMUNITY
Start: 2015-10-18 | End: 2018-01-30

## 2018-01-30 NOTE — LETTER
January 30, 2018     Patient: Sowmya Cheema   YOB: 1980   Date of Visit: 1/30/2018       To Whom it May Concern:    Sowmya Cheema is under my professional care  She was seen in my office on 1/30/2018  She may return to work on 01/31/2018       If you have any questions or concerns, please don't hesitate to call           Sincerely,          Alex Guerrero III, DO        CC: Sowmya Cheema

## 2018-01-30 NOTE — PATIENT INSTRUCTIONS
Patient to continue wearing ankle brace for the next 4-6 months only during high risk activity including jogging running weight training  I recommend against wearing high heels until 4 months since date of injury  I reviewed risk of continued elevated blood pressure with patient including stroke, heart attack, hemorrhage, aneurysm, and death  I explained the patient that her blood pressure will never be cured unless she has Medicine in her body at all times  I explained to her that she requires laboratory evaluation as well as prescription drug medicine to control her blood pressure and prevent serious medical problem  I explained to her that even if she has no symptoms that she still is at risk for severe medical issues

## 2018-01-30 NOTE — PROGRESS NOTES
1  Pain, joint, ankle and foot, right  XR ankle 3+ vw right   2  Closed fracture of distal end of right fibula with routine healing, unspecified fracture morphology, subsequent encounter     3  Essential hypertension       Orders Placed This Encounter   Procedures    XR ankle 3+ vw right        Imaging Studies (I personally reviewed results in PACS):   X-ray right ankle:  No acute osseous abnormality  There is persistent spur small avulsion fracture distal fibula without displacement and with evidence of healing  IMPRESSION:  Right small avulsion fracture distal fibula  Date of injury 11/26/2017      Return if symptoms worsen or fail to improve  Patient Instructions     Patient to continue wearing ankle brace for the next 4-6 months only during high risk activity including jogging running weight training  I recommend against wearing high heels until 4 months since date of injury  I reviewed risk of continued elevated blood pressure with patient including stroke, heart attack, hemorrhage, aneurysm, and death  I explained the patient that her blood pressure will never be cured unless she has Medicine in her body at all times  I explained to her that she requires laboratory evaluation as well as prescription drug medicine to control her blood pressure and prevent serious medical problem  I explained to her that even if she has no symptoms that she still is at risk for severe medical issues  CHIEF COMPLAINT:   Follow-up right distal fibular fractureCHIEF COMPLAINT:    HPI:  Ajay Nolan is a 40 y o  female  who presents for follow-up of right distal fibular fracture  Date of injury 11/26/2017  Follow-up interval:  8 weeks  Last visit patient had mild tenderness distal fibular  She was continued on lace-up ankle brace and physical therapy  Genice Pean states she feels 100% improved  She has been walking with her brace without any issues    She denies any stiffness or weakness  Recently patient was in the emergency department for laceration to her finger from a knife wound  Subsequently it was noted that she had elevated blood pressure significantly high  Patient was encouraged to follow-up with the primary care physician however she does not have 1 at this time  She is currently seeking primary care physician  Today, patient denies any headache chest pain trouble breathing change in vision weakness slurring his speech difficulty talking  She is currently not taking any blood pressure medications  She has noted she has had blood pressure issues for approximately 1 year  She denies any symptoms  Review of Systems   Constitutional: Negative for chills and fever  Neurological: Negative for numbness  Following history reviewed and update:    History reviewed  No pertinent past medical history  Past Surgical History:   Procedure Laterality Date    GALLBLADDER SURGERY  2015    TUBAL LIGATION       Social History   History   Alcohol Use No     History   Drug Use No     History   Smoking Status    Current Every Day Smoker    Packs/day: 0 50   Smokeless Tobacco    Never Used     Family History   Problem Relation Age of Onset    Cancer Mother     No Known Problems Father      No Known Allergies       Physical Exam   Constitutional: She is oriented to person, place, and time  She appears well-developed and well-nourished  HENT:   Head: Normocephalic and atraumatic  Right Ear: External ear normal    Left Ear: External ear normal    Nose: Nose normal    Eyes: Conjunctivae are normal  Right eye exhibits no discharge  Left eye exhibits no discharge  No scleral icterus  Neck: Neck supple  Pulmonary/Chest: Effort normal  No respiratory distress  Neurological: She is alert and oriented to person, place, and time  Psychiatric: She has a normal mood and affect   Her behavior is normal  Judgment and thought content normal        Right Ankle Exam   Right ankle exam is normal   Swelling: none    Tenderness   The patient is experiencing no tenderness  Range of Motion   The patient has normal right ankle ROM  Muscle Strength   The patient has normal right ankle strength    Dorsiflexion:  5/5  Plantar flexion:  5/5  Anterior tibial:  5/5  Posterior tibial:  5/5  Gastrocsoleus:  5/5  Peroneal muscle:  5/5    Tests   Varus tilt: negative    Other   Erythema: absent  Sensation: normal     Comments:   Negative tibia-fibula squeeze   Negative metatarsal squeeze  Negative calcaneal squeeze  Negative external rotation dorsiflexion test              Procedures

## 2018-01-30 NOTE — PROGRESS NOTES
Assessment/Plan:    No problem-specific Assessment & Plan notes found for this encounter  Diagnoses and all orders for this visit:    Hypertension, unspecified type  -     Comprehensive metabolic panel; Future  -     Lipid panel; Future  -     TSH, 3rd generation with T4 reflex; Future  -     Home Study; Future  -     perindopril (ACEON) 4 MG tablet; Take 1 tablet (4 mg total) by mouth daily    Snoring  -     Home Study; Future    Large tonsils  -     Home Study; Future      Blood pressure not well controlled  Unclear if this is essential hypertension or potentially hypertension secondary to sleep apnea  I will have patient get some baseline labs  Due to her snoring enlarged tonsils I recommended she get a sleep study to assess for sleep apnea and patient agreed  Order placed for a home sleep study  In the meantime I recommended patient start medication within ACE-inhibitor  Potential side effects of Ace inhibitors discussed and patient is agreeable to starting the medication  Close follow-up in two weeks or sooner if needed  Subjective:      Patient ID: Libby Lomeli is a 40 y o  female  Patient presents to establish care  Has high blood pressure  Diagnosed about one year ago at [facility] but patient unsure how long prior to that it has been elevated  She does not have any headaches or visual problems  She does smoke about one pack per day for the past 20 years  She does snore loudly but is unaware of any pauses in breathing when she sleeps  The following portions of the patient's history were reviewed and updated as appropriate: allergies, current medications, past family history, past medical history, past social history, past surgical history and problem list     Review of Systems   Constitutional: Negative for fever  Respiratory: Negative for shortness of breath  Cardiovascular: Negative for chest pain           Objective:     Physical Exam   Constitutional: She is oriented to person, place, and time  Vital signs are normal    HENT:   Head: Normocephalic and atraumatic  Right Ear: Tympanic membrane, external ear and ear canal normal    Left Ear: Tympanic membrane, external ear and ear canal normal    Nose: Nose normal    Mouth/Throat: Uvula is midline, oropharynx is clear and moist and mucous membranes are normal    Tonsils enlarged bilaterally  Eyes: Conjunctivae, EOM and lids are normal  Pupils are equal, round, and reactive to light  Neck: Trachea normal  Neck supple  No thyromegaly present  Cardiovascular: Normal rate, regular rhythm, S1 normal and S2 normal     No murmur heard  Pulmonary/Chest: Effort normal and breath sounds normal    Abdominal: There is no hepatosplenomegaly  Lymphadenopathy:     She has no cervical adenopathy  Neurological: She is alert and oriented to person, place, and time  Skin: Skin is warm and dry     Psychiatric: Her speech is normal and behavior is normal  Judgment and thought content normal  Cognition and memory are normal

## 2018-01-31 ENCOUNTER — TELEPHONE (OUTPATIENT)
Dept: FAMILY MEDICINE CLINIC | Facility: MEDICAL CENTER | Age: 38
End: 2018-01-31

## 2018-01-31 DIAGNOSIS — I10 HYPERTENSION, UNSPECIFIED TYPE: Primary | ICD-10-CM

## 2018-01-31 RX ORDER — BENAZEPRIL HYDROCHLORIDE 20 MG/1
20 TABLET ORAL DAILY
Qty: 90 TABLET | Refills: 0 | Status: SHIPPED | OUTPATIENT
Start: 2018-01-31 | End: 2018-05-14

## 2018-01-31 NOTE — TELEPHONE ENCOUNTER
Pt aware, understands instructions  She also said that her insurance doesn't cover the perindopril without a prior auth

## 2018-01-31 NOTE — TELEPHONE ENCOUNTER
----- Message from Patricia Anne DO sent at 1/31/2018  8:28 AM EST -----  Labs reviewed  Sodium slightly low  No intervention at this time  May improve since patient has been started on blood pressure medication  Cholesterol levels not too bad  No additional medications at this time    Thyroid function normal

## 2018-02-12 ENCOUNTER — OFFICE VISIT (OUTPATIENT)
Dept: FAMILY MEDICINE CLINIC | Facility: MEDICAL CENTER | Age: 38
End: 2018-02-12
Payer: COMMERCIAL

## 2018-02-12 VITALS
RESPIRATION RATE: 18 BRPM | WEIGHT: 179 LBS | BODY MASS INDEX: 27.62 KG/M2 | SYSTOLIC BLOOD PRESSURE: 132 MMHG | HEART RATE: 76 BPM | DIASTOLIC BLOOD PRESSURE: 86 MMHG

## 2018-02-12 DIAGNOSIS — I10 HYPERTENSION, UNSPECIFIED TYPE: Primary | ICD-10-CM

## 2018-02-12 PROBLEM — M25.579 ANKLE PAIN: Status: RESOLVED | Noted: 2017-12-05 | Resolved: 2018-02-12

## 2018-02-12 PROBLEM — S82.831A FRACTURE OF FIBULA, DISTAL, RIGHT, CLOSED: Status: RESOLVED | Noted: 2017-12-05 | Resolved: 2018-02-12

## 2018-02-12 PROCEDURE — 99213 OFFICE O/P EST LOW 20 MIN: CPT | Performed by: FAMILY MEDICINE

## 2018-02-12 NOTE — PROGRESS NOTES
Assessment/Plan:    No problem-specific Assessment & Plan notes found for this encounter  Diagnoses and all orders for this visit:    Hypertension, unspecified type    Repeat measurements shows blood pressure is well controlled  I encouraged patient to keep the appointment for the sleep study as if it is discovered she has sleep apnea wearing the CPAP will help to lower the blood pressure  Therefore patient could potentially get off the medication  However, if she does not have sleep apnea she will likely be on the medication lifelong  Follow-up in three months or sooner if needed  Subjective:      Patient ID: Kamala Benjamin is a 40 y o  female  Patient presents for follow-up of hypertension  Started on an ACE-inhibitor at her last office visit  Is currently on benazepril 20 mg daily  Tolerating medication well without any cough or any other symptoms  Does feel better since starting medication  Patient has an appointment for a sleep study in April  The following portions of the patient's history were reviewed and updated as appropriate: allergies, current medications, past family history, past medical history, past social history, past surgical history and problem list     Review of Systems   Constitutional: Negative for fever  Respiratory: Negative for shortness of breath  Cardiovascular: Negative for chest pain  Objective:    Vitals:    02/12/18 1538   BP: 132/86   Pulse:    Resp:         Physical Exam   Constitutional: She appears well-developed and well-nourished  Cardiovascular: Normal rate, regular rhythm and normal heart sounds      Pulmonary/Chest: Effort normal and breath sounds normal

## 2018-04-04 ENCOUNTER — HOSPITAL ENCOUNTER (OUTPATIENT)
Dept: SLEEP CENTER | Facility: CLINIC | Age: 38
Discharge: HOME | End: 2018-04-04
Payer: COMMERCIAL

## 2018-04-04 DIAGNOSIS — I10 HYPERTENSION, UNSPECIFIED TYPE: ICD-10-CM

## 2018-04-04 DIAGNOSIS — J35.1 LARGE TONSILS: ICD-10-CM

## 2018-04-04 DIAGNOSIS — R06.83 SNORING: ICD-10-CM

## 2018-04-04 PROCEDURE — 95806 SLEEP STUDY UNATT&RESP EFFT: CPT | Performed by: INTERNAL MEDICINE

## 2018-04-04 PROCEDURE — G0399 HOME SLEEP TEST/TYPE 3 PORTA: HCPCS

## 2018-04-16 ENCOUNTER — TELEPHONE (OUTPATIENT)
Dept: FAMILY MEDICINE CLINIC | Facility: MEDICAL CENTER | Age: 38
End: 2018-04-16

## 2018-04-16 NOTE — TELEPHONE ENCOUNTER
----- Message from Brandi Patricia DO sent at 4/16/2018  8:41 AM EDT -----  Sleep study shows mild sleep apnea    Does patient have a follow-up appointment with the sleep specialist?

## 2018-04-17 ENCOUNTER — TELEPHONE (OUTPATIENT)
Dept: SLEEP CENTER | Facility: CLINIC | Age: 38
End: 2018-04-17

## 2018-04-17 NOTE — TELEPHONE ENCOUNTER
Spoke with patient regarding mild LINDSEY on home sleep testing  She spoke with her doctor's office and they want her to see a sleep specialist   Given contact information to schedule appointment with Dr Melecio Murray in [facility] office

## 2018-05-01 RX ORDER — BENAZEPRIL HYDROCHLORIDE 20 MG/1
TABLET ORAL
Qty: 90 TABLET | Refills: 0 | OUTPATIENT
Start: 2018-05-01

## 2018-05-14 ENCOUNTER — OFFICE VISIT (OUTPATIENT)
Dept: FAMILY MEDICINE CLINIC | Facility: MEDICAL CENTER | Age: 38
End: 2018-05-14
Payer: COMMERCIAL

## 2018-05-14 VITALS
DIASTOLIC BLOOD PRESSURE: 86 MMHG | SYSTOLIC BLOOD PRESSURE: 120 MMHG | HEART RATE: 74 BPM | BODY MASS INDEX: 28.02 KG/M2 | WEIGHT: 181.6 LBS | OXYGEN SATURATION: 98 %

## 2018-05-14 DIAGNOSIS — I10 ESSENTIAL HYPERTENSION: Primary | ICD-10-CM

## 2018-05-14 PROCEDURE — 3079F DIAST BP 80-89 MM HG: CPT | Performed by: FAMILY MEDICINE

## 2018-05-14 PROCEDURE — 99213 OFFICE O/P EST LOW 20 MIN: CPT | Performed by: FAMILY MEDICINE

## 2018-05-14 PROCEDURE — 3074F SYST BP LT 130 MM HG: CPT | Performed by: FAMILY MEDICINE

## 2018-05-14 RX ORDER — BENAZEPRIL HYDROCHLORIDE 10 MG/1
10 TABLET ORAL DAILY
Qty: 90 TABLET | Refills: 0 | Status: SHIPPED | OUTPATIENT
Start: 2018-05-14 | End: 2018-06-09 | Stop reason: SDUPTHER

## 2018-05-14 RX ORDER — ADHESIVE BANDAGE 3/4"
BANDAGE TOPICAL DAILY
Qty: 1 EACH | Refills: 0 | Status: SHIPPED | OUTPATIENT
Start: 2018-05-14

## 2018-05-14 NOTE — PROGRESS NOTES
Assessment/Plan:    No problem-specific Assessment & Plan notes found for this encounter  Diagnoses and all orders for this visit:    Essential hypertension  -     benazepril (LOTENSIN) 10 mg tablet; Take 1 tablet (10 mg total) by mouth daily  -     Blood Pressure Monitoring (BLOOD PRESSURE CUFF) MISC; by Does not apply route daily Test once daily  Blood pressure is well controlled however patient is getting symptomatic on her current dose of benazepril which is at 20 mg daily  Will have her decrease her dose of benazepril to 10 mg daily  Will have her check her blood pressure at home daily  I did fax in a prescription for a home blood pressure cuff to her pharmacy  Will plan on checking a BMP at her follow-up appointment  If her blood pressure is high at her follow-up appointment but her symptoms of dizziness have improved then we can increase her benazepril dose to 15 mg daily  Alternatively we could also add other medications or change her medications altogether  Follow-up in one month or sooner if needed  Subjective:      Patient ID: Darcy Martinez is a 45 y o  female  Patient presents for follow-up of hypertension  She states that her blood pressure is better controlled but ever since she started taking the medication she is getting dizzy and lightheaded in the morning upon waking  She is wondering if her dose of blood pressure medication is too high  She does not take her blood pressure at home  She does not have a blood pressure cuff  She would be willing to check her blood pressure if she did have a blood pressure cuff  The following portions of the patient's history were reviewed and updated as appropriate: allergies, current medications and problem list     Review of Systems   Constitutional: Negative for fever  Respiratory: Negative for shortness of breath  Cardiovascular: Negative for chest pain           Objective:      /86   Pulse 74   Wt 82 4 kg (181 lb 9 6 oz)   SpO2 98%   BMI 28 02 kg/m²          Physical Exam   Constitutional: She appears well-developed and well-nourished  Cardiovascular: Normal rate, regular rhythm and normal heart sounds      Pulmonary/Chest: Effort normal and breath sounds normal

## 2018-06-09 DIAGNOSIS — I10 ESSENTIAL HYPERTENSION: ICD-10-CM

## 2018-06-11 RX ORDER — BENAZEPRIL HYDROCHLORIDE 10 MG/1
TABLET ORAL
Qty: 90 TABLET | Refills: 0 | Status: SHIPPED | OUTPATIENT
Start: 2018-06-11

## 2019-03-11 ENCOUNTER — TELEPHONE (OUTPATIENT)
Dept: FAMILY MEDICINE CLINIC | Facility: MEDICAL CENTER | Age: 39
End: 2019-03-11

## 2019-03-11 NOTE — TELEPHONE ENCOUNTER
----- Message from Aisha Esparza DO sent at 3/10/2019  4:22 PM EDT -----  Sleep study shows mild sleep apnea    I do not see that pt has seen the sleep specialist

## 2019-03-14 NOTE — TELEPHONE ENCOUNTER
Spoke with patient  She is uninsured at this time  She did make appt to f/u with her bp and will discuss with Dr Hussein Yañez at that time

## 2019-03-19 ENCOUNTER — TELEPHONE (OUTPATIENT)
Dept: FAMILY MEDICINE CLINIC | Facility: MEDICAL CENTER | Age: 39
End: 2019-03-19

## 2019-03-19 NOTE — TELEPHONE ENCOUNTER
Appointment canceled for [facility] ([id number])   Visit Type: OFFICE VISIT SHORT PG   Date        Time      Length    Provider                  Department   3/26/2019    5:00 PM  15 mins  Miryam Renteria DO [facility]      Reason for Cancellation: Error      Patient Comments: Scheduling conflict  I'd like to reschedule for next week  Thank you!

## 2019-07-22 ENCOUNTER — HOSPITAL ENCOUNTER (EMERGENCY)
Facility: HOSPITAL | Age: 39
Discharge: HOME | End: 2019-07-22
Attending: EMERGENCY MEDICINE | Admitting: EMERGENCY MEDICINE

## 2019-07-22 VITALS
SYSTOLIC BLOOD PRESSURE: 184 MMHG | HEART RATE: 97 BPM | TEMPERATURE: 97.8 F | RESPIRATION RATE: 16 BRPM | OXYGEN SATURATION: 99 % | WEIGHT: 185 LBS | BODY MASS INDEX: 28.55 KG/M2 | DIASTOLIC BLOOD PRESSURE: 120 MMHG

## 2019-07-22 DIAGNOSIS — J03.90 ACUTE TONSILLITIS: Primary | ICD-10-CM

## 2019-07-22 PROCEDURE — 96365 THER/PROPH/DIAG IV INF INIT: CPT

## 2019-07-22 PROCEDURE — 99284 EMERGENCY DEPT VISIT MOD MDM: CPT | Performed by: EMERGENCY MEDICINE

## 2019-07-22 PROCEDURE — 96375 TX/PRO/DX INJ NEW DRUG ADDON: CPT

## 2019-07-22 PROCEDURE — 99283 EMERGENCY DEPT VISIT LOW MDM: CPT

## 2019-07-22 PROCEDURE — 96366 THER/PROPH/DIAG IV INF ADDON: CPT

## 2019-07-22 RX ORDER — LIDOCAINE HYDROCHLORIDE 20 MG/ML
15 SOLUTION OROPHARYNGEAL ONCE
Status: COMPLETED | OUTPATIENT
Start: 2019-07-22 | End: 2019-07-22

## 2019-07-22 RX ORDER — CLINDAMYCIN PALMITATE HYDROCHLORIDE 75 MG/5ML
300 SOLUTION ORAL 4 TIMES DAILY
Qty: 800 ML | Refills: 0 | Status: SHIPPED | OUTPATIENT
Start: 2019-07-22 | End: 2019-08-01

## 2019-07-22 RX ORDER — CLINDAMYCIN PHOSPHATE 600 MG/50ML
600 INJECTION INTRAVENOUS ONCE
Status: COMPLETED | OUTPATIENT
Start: 2019-07-22 | End: 2019-07-22

## 2019-07-22 RX ORDER — PREDNISOLONE SODIUM PHOSPHATE 15 MG/5ML
60 SOLUTION ORAL DAILY
Qty: 80 ML | Refills: 0 | Status: SHIPPED | OUTPATIENT
Start: 2019-07-22 | End: 2019-07-26

## 2019-07-22 RX ORDER — METHYLPREDNISOLONE SODIUM SUCCINATE 125 MG/2ML
125 INJECTION, POWDER, LYOPHILIZED, FOR SOLUTION INTRAMUSCULAR; INTRAVENOUS ONCE
Status: COMPLETED | OUTPATIENT
Start: 2019-07-22 | End: 2019-07-22

## 2019-07-22 RX ADMIN — SODIUM CHLORIDE 1000 ML: 0.9 INJECTION, SOLUTION INTRAVENOUS at 15:22

## 2019-07-22 RX ADMIN — METHYLPREDNISOLONE SODIUM SUCCINATE 125 MG: 125 INJECTION, POWDER, FOR SOLUTION INTRAMUSCULAR; INTRAVENOUS at 15:20

## 2019-07-22 RX ADMIN — LIDOCAINE HYDROCHLORIDE 15 ML: 20 SOLUTION ORAL; TOPICAL at 15:19

## 2019-07-22 RX ADMIN — CLINDAMYCIN PHOSPHATE 600 MG: 600 INJECTION, SOLUTION INTRAVENOUS at 15:22

## 2019-07-22 NOTE — ED PROVIDER NOTES
History  Chief Complaint   Patient presents with    Difficulty Swallowing     pt was sent here from urgent care, has an abcess on ther tonsils states painfula nd diff to swallowing      51-year-old female with history of hypertension here for evaluation of sore throat  She reports that approximately 5 days ago she developed painful swallowing swollen glands in her neck, her voice had been mildly muffled, although she continues to tolerate p o  diiner chicken dinner for last night, yogurt this morning, she mentioned this to her boss he looked in the back of her throat suggested she go to urgent care urgent care evaluated the patient and believe she may have a peritonsillar abscess considered to the emergency department for further evaluation  Patient does have a history of documented enlarged tonsils she notes that she has painful swallowing and mild muffled voice, with symmetric adenopathy, but otherwise denies fevers shaking chills headache auditory visual or balance complaint she denies difficulty swallowing or handling her secretions, she denies difficulty opening her mouth or trismus, she denies other viral symptoms history of mono history of strep, chest pain cough shortness of breath nausea vomiting anorexia abdominal pain change in bowels or  bladder menses irregularity joint pain swelling rashes other associated symptoms  Complete review systems otherwise negative as noted          Prior to Admission Medications   Prescriptions Last Dose Informant Patient Reported? Taking?    Blood Pressure Monitoring (BLOOD PRESSURE CUFF) MISC   No No   Sig: by Does not apply route daily Test once daily   benazepril (LOTENSIN) 10 mg tablet   No No   Sig: TAKE 1 TABLET BY MOUTH EVERY DAY      Facility-Administered Medications: None       Past Medical History:   Diagnosis Date    Fracture of fibula, distal, right, closed 12/5/2017       Past Surgical History:   Procedure Laterality Date    CHOLECYSTECTOMY      GALLBLADDER SURGERY  2015    TUBAL LIGATION         Family History   Problem Relation Age of Onset    Cancer Mother     No Known Problems Father      I have reviewed and agree with the history as documented  Social History     Tobacco Use    Smoking status: Current Every Day Smoker     Packs/day: 0 50    Smokeless tobacco: Never Used   Substance Use Topics    Alcohol use: No    Drug use: No        Review of Systems   Constitutional: Negative for appetite change, chills and fever  HENT: Positive for sore throat and voice change  Negative for congestion, nosebleeds, rhinorrhea and trouble swallowing  Eyes: Negative for photophobia, pain and visual disturbance  Respiratory: Negative for cough and shortness of breath  Cardiovascular: Negative for chest pain and palpitations  Gastrointestinal: Negative for abdominal pain, nausea and vomiting  Genitourinary: Negative for dysuria, frequency, menstrual problem and urgency  Musculoskeletal: Negative for arthralgias, back pain and myalgias  Skin: Negative for color change and rash  Neurological: Negative for dizziness, weakness, light-headedness, numbness and headaches  Hematological: Negative for adenopathy  Does not bruise/bleed easily  Psychiatric/Behavioral: Negative for agitation and behavioral problems  All other systems reviewed and are negative  Physical Exam  Physical Exam   Constitutional: She is oriented to person, place, and time  She appears well-developed and well-nourished  No distress  Very well smiling conversational in no acute distress mom at bedside   HENT:   Head: Normocephalic and atraumatic  Right Ear: External ear normal    Left Ear: External ear normal    Mouth/Throat: Oropharyngeal exudate present     There is no drooling stridor trismus patient is phonating fluently pleasant conversational she is able to open her mouth completely both tonsils are markedly enlarged with this left being slightly more prominent than the right although she has got exudates on both, there is no swelling or involvement of the soft palate uvula is midline, there is no drooling stridor trismus she has symmetric anterior cervical adenopathy she has full range of motion of her neck and hyoid without significant discomfort submental and submandibular spaces are soft without tongue elevation dentition unremarkable no overlying skin changes consistent with purulent tonsillitis, no clinical evidence of deep space neck infection   Eyes: Pupils are equal, round, and reactive to light  EOM are normal    Neck: Normal range of motion  Neck supple  No tracheal deviation present  Cardiovascular: Normal rate, regular rhythm and normal heart sounds  Exam reveals no gallop and no friction rub  No murmur heard  Pulmonary/Chest: Effort normal and breath sounds normal  She has no wheezes  She has no rales  Abdominal: Soft  Bowel sounds are normal  She exhibits no distension  There is no tenderness  There is no rebound and no guarding  Musculoskeletal: Normal range of motion  She exhibits no edema or tenderness  Neurological: She is alert and oriented to person, place, and time  No cranial nerve deficit  She exhibits normal muscle tone  Coordination normal    Skin: Skin is warm and dry  No rash noted  Psychiatric: She has a normal mood and affect  Her behavior is normal    Nursing note and vitals reviewed        Vital Signs  ED Triage Vitals   Temperature Pulse Respirations Blood Pressure SpO2   07/22/19 1434 07/22/19 1434 07/22/19 1434 07/22/19 1434 07/22/19 1434   97 8 °F (36 6 °C) 97 16 (!) 184/120 99 %      Temp Source Heart Rate Source Patient Position - Orthostatic VS BP Location FiO2 (%)   07/22/19 1425 07/22/19 1425 07/22/19 1425 07/22/19 1425 --   Oral Monitor Sitting Left arm       Pain Score       --                  Vitals:    07/22/19 1425 07/22/19 1434   BP:  (!) 184/120   Pulse:  97   Patient Position - Orthostatic VS: Sitting Sitting         Visual Acuity      ED Medications  Medications   sodium chloride 0 9 % bolus 1,000 mL (0 mL Intravenous Stopped 7/22/19 1651)   clindamycin (CLEOCIN) IVPB (premix) 600 mg (0 mg Intravenous Stopped 7/22/19 1705)   methylPREDNISolone sodium succinate (Solu-MEDROL) injection 125 mg (125 mg Intravenous Given 7/22/19 1520)   Lidocaine Viscous HCl (XYLOCAINE) 2 % mucosal solution 15 mL (15 mL Swish & Swallow Given 7/22/19 1519)       Diagnostic Studies  Results Reviewed     None                 No orders to display              Procedures  Procedures       ED Course  ED Course as of Jul 22 1824   Mon Jul 22, 2019   1505 Patient has kissing tonsils with her left tonsil being slightly more prominent that her right, she does have symmetric exudates, the soft palate is symmetric without evidence of abscess, she has full range of motion of her neck and hyoid without discomfort although adenopathy is noted there is no drooling stridor or trismus on exam the symptoms have been present for 5 days, clinically the patient has tonsillitis with markedly enlarged tonsils, there is no clinical evidence of peritonsillar abscess or deep space neck infection at this time, she continues to tolerate p o  Handle secretions again this is been going on for 5 days, she has phonating fluently without discomfort and again there is no trismus, patient expressed concern about insurance, offered imaging, there is no clinical drainable collection at this time and high suspicion of tonsillitis, after discussion with patient family will give dose of clindamycin steroids this is been successful for her previously, understands that if she does develop trismus at any time she is unable to swallow she is unable to tolerate p o   She has worsening or any other concerning symptoms at any time sure return for CT of her neck otherwise again at this time history and physical exam is not consistent with abscess, will be treated with clindamycin for tonsillitis less likely small developing abscess patient family agreeable plan      1619 Patient seen evaluated she is able  prescriptions she is eating ice cream smiling and tolerating p o , will return if she develops worsening or other concerning symptoms at any time for re-evaluation otherwise will follow up as discussed patient family understands agrees to discharge instructions and plan                                  MDM    Disposition  Final diagnoses:   Acute tonsillitis     Time reflects when diagnosis was documented in both MDM as applicable and the Disposition within this note     Time User Action Codes Description Comment    7/22/2019  4:21 PM Melissa Akins [[id number]] Acute tonsillitis       ED Disposition     ED Disposition Condition Date/Time Comment    Discharge Stable Mon Jul 22, 2019  4:21 PM 15[facility] discharge to home/self care              Follow-up Information     Follow up With Specialties Details Why Contact Info Additional Information    [facility] Emergency Department Emergency Medicine  If symptoms worsen [address] [facility] [address], [address] [place], [place], [address], DO Family Medicine In 1 week  [address]  [facility]  [phone]             Discharge Medication List as of 7/22/2019  4:23 PM      START taking these medications    Details   clindamycin (CLEOCIN) 75 mg/5 mL solution Take 20 mL (300 mg total) by mouth 4 (four) times a day for 10 days, Starting Mon 7/22/2019, Until Thu 8/1/2019, Print      prednisoLONE (ORAPRED) 15 mg/5 mL oral solution Take 20 mL (60 mg total) by mouth daily for 4 days, Starting Mon 7/22/2019, Until Fri 7/26/2019, Print         CONTINUE these medications which have NOT CHANGED    Details   benazepril (LOTENSIN) 10 mg tablet TAKE 1 TABLET BY MOUTH EVERY DAY, Normal      Blood Pressure Monitoring (BLOOD PRESSURE CUFF) MISC by Does not apply route daily Test once daily, Starting Mon 5/14/2018, Normal           No discharge procedures on file      ED Provider  Electronically Signed by           Nelia Curtis DO  07/22/19 7989

## 2019-07-22 NOTE — DISCHARGE INSTRUCTIONS
Please return if you developed worsening or other concerning symptoms as instructed otherwise please fill these prescriptions take them today and follow up as discussed

## 2023-02-23 ENCOUNTER — VBI (OUTPATIENT)
Dept: ADMINISTRATIVE | Facility: OTHER | Age: 43
End: 2023-02-23

## 2023-02-23 NOTE — TELEPHONE ENCOUNTER
02/23/23 10:01 AM     [facility] [employer] SmartForm used to document caregap status      Horacio De La Torre MA

## 2023-08-17 ENCOUNTER — VBI (OUTPATIENT)
Dept: ADMINISTRATIVE | Facility: OTHER | Age: 43
End: 2023-08-17